# Patient Record
(demographics unavailable — no encounter records)

---

## 2024-10-14 NOTE — DISCUSSION/SUMMARY
[Patient] : the patient [With Me] : with me [___ Month(s)] : in [unfilled] month(s) [FreeTextEntry1] : 76-year-old male with past medical as above presenting for follow-up.  Stable symptoms - would benefit from both CVP referral for weight loss as well as Cardiac rehab.   1.  CAD - no change to anatomy.  Continue aspirin 81 mg p.o. daily 2.  Chronic systolic congestive heart failure-close to euvolemic on exam, CardioMEMS readings have been within range.  Continue Entresto to 24-26 mg p.o. twice daily, continue metoprolol succinate 50 mg p.o. twice daily, spironolactone 50 mg p.o. daily, torsemide 100 mg p.o. twice daily and Jardiance 10 mg p.o. daily. Extra 1/4 pill of torsemide per patient's request.  3.  Hyperlipidemia-continue aspirin 81 mg p.o. daily and atorvastatin 80 mg p.o. nightly.  RTC 3 months

## 2024-10-14 NOTE — PHYSICAL EXAM
[Well Developed] : well developed [Well Nourished] : well nourished [No Acute Distress] : no acute distress [Normal Conjunctiva] : normal conjunctiva [Normal Venous Pressure] : normal venous pressure [No Carotid Bruit] : no carotid bruit [Normal S1, S2] : normal S1, S2 [No Rub] : no rub [No Gallop] : no gallop [Clear Lung Fields] : clear lung fields [Good Air Entry] : good air entry [No Respiratory Distress] : no respiratory distress  [Soft] : abdomen soft [Non Tender] : non-tender [No Masses/organomegaly] : no masses/organomegaly [Normal Bowel Sounds] : normal bowel sounds [Abnormal Gait] : abnormal gait [No Edema] : no edema [No Cyanosis] : no cyanosis [No Clubbing] : no clubbing [No Varicosities] : no varicosities [No Rash] : no rash [No Skin Lesions] : no skin lesions [Moves all extremities] : moves all extremities [No Focal Deficits] : no focal deficits [Normal Speech] : normal speech [Alert and Oriented] : alert and oriented [Normal memory] : normal memory [de-identified] : AICD in situ.  [de-identified] : 1+ pitting bilaterally to mid shins

## 2024-10-14 NOTE — REASON FOR VISIT
[Cardiac Failure] : cardiac failure [Arrhythmia/ECG Abnorrmalities] : arrhythmia/ECG abnormalities [Hyperlipidemia] : hyperlipidemia [Hypertension] : hypertension [Coronary Artery Disease] : coronary artery disease [FreeTextEntry3] : Dr. Burroughs

## 2024-10-14 NOTE — HISTORY OF PRESENT ILLNESS
[FreeTextEntry1] : 76M past medical history of CAD status post CABG and PCI, chronic systolic congestive heart failure with an EF in the 20 to 25% range, AICD implantation, diabetes mellitus, hyperlipidemia, CKD and VT presenting for follow-up.  He was scheduled for nuclear stress testing but had chest pain on minimal exertion and was urged to go to the emergency room.  He ended up being added onto the schedule today to discuss cardiac catheterization.  10/14/2024: Feeling well from a cardiac perspective.  Denies significant chest pains and palpitations.

## 2024-10-14 NOTE — REVIEW OF SYSTEMS
[Dyspnea on exertion] : dyspnea during exertion [Lower Ext Edema] : lower extremity edema [Joint Pain] : joint pain [Negative] : Heme/Lymph [Chest Discomfort] : no chest discomfort

## 2024-10-18 NOTE — DISCUSSION/SUMMARY
[FreeTextEntry1] :  Continue daily readings. Track and trend Cardiomems numbers. Medications will be adjusted as per CMEMS and provider Follow up appointment scheduled.

## 2024-10-18 NOTE — HISTORY OF PRESENT ILLNESS
[de-identified] : CardioMEMs Summary 9/12/24-10/18/24 DOI 10/11/2022  AVG PAD:13-19 mmHg SUMMARY: During the period indicated above, the patient's pulmonary artery pressures were monitored at least weekly and based on the pressures, the pts medications were adjusted. Medication changes were communicated to the patient.   The daily data reports are in the Cardio MEMS flowsheet and are also archived in the [email or web address] PCN system. See [facility] flowsheet for Trends. I use this technology to monitor PA pressures on a weekly basis to ensure patients are within their optimal range to prevent decompensation. PMH

## 2024-11-11 NOTE — ASSESSMENT
[FreeTextEntry1] : 77 yo M with HFrEF 2/2 ICM, CAD s/p 3v CABG '08 + MVr ring annuloplasty, s/p cardioMEMS and CRT-D, VT ablation Feb 2020, inappropriate shock 11/20, FHx CAD, HTN, DLP, DM 2, CKD 3, COPD, obesity BMI 45, ex-smoker, pulmonary nodules and back pain who presents for heart failure follow-up.  He has ACC/AHA stage C cardiomyopathy with NYHA class II-III symptoms.

## 2024-11-11 NOTE — REASON FOR VISIT
[Cardiac Failure] : cardiac failure [FreeTextEntry3] : Juan Palmer MD [FreeTextEntry1] : Primary cardiologist: Dr. Luke EP: Dr. Curtis

## 2024-11-11 NOTE — CARDIOLOGY SUMMARY
[de-identified] : 11/11/2024: NSR, HR 83, RBBB question BiV pacing, diffuse T wave abnormality. 4/6/21: A-V Paced [de-identified] : 7/19/2024: LVEF 22%, LVEDD 7.4cm, IVSd 1.2cm, 1.3cm, mitral annuloplasty ring without significant regurgitation, mild TR. 6/29/2023: LVEF < 20%, LVEDD 7.2 cm, IVSd 1.1 cm, mitral annuloplasty ring with no paravalvular regurgitation, MG 3.5 mmHg, mild TR, PASP 15 mmHg. 11/3/20: LVEF 25-30% LVIDd 7.65cm LVOT VTI 11.1cm, ?mild MS, RV nl size/function,  mild TR, RVSP 36 mmHg (RAP ~10mmHg) [de-identified] : 7/17/23: Patent LIMA to LAD, SVG to RPDA and OM 1. LVEDP 14 10/11/22 RHC/MEMs implant RA 10, RV 36/10, PA 39/20(27), PCWP 17, [place] CO/CI 4.59/2.12. 11/2020 C LM tubular 30%, LAD ostial 100%, LCx mid 100%, RI luminal irreg, RCA prox 100%. LIMA patent. SVG-OM1 minor luminal irreg. SVG-RCA minor luminal irreg.  [de-identified] : 6MWT 9/24/24:  444 m, had to rest after 2 minutes and resumed after resting for 2 1/2 minutes. Complaints: Leg pain and [place], O2 sat 94-97%,HR 100s.

## 2024-11-11 NOTE — HISTORY OF PRESENT ILLNESS
[FreeTextEntry1] : 75 yo M with HFrEF 2/2 ICM, CAD s/p 3v CABG '08 + MVr ring annuloplasty, s/p cardioMEMS and CRT-D, VT ablation Feb 2020, inappropriate shock 11/20, FHx CAD, HTN, DLP, DM 2, CKD 3, COPD, obesity BMI 45, ex-smoker, pulmonary nodules and back pain presents today for heart failure follow-up.  Patient reports that his heart disease started at age 47 when he had his first MI. He had multiple MIs afterwards associated to ischemic cardiomyopathy. He underwent CABG in 2008 ([facility]) and has had 2 devices placed so far. Most recently he underwent CRT-D placement in 2016. He also had a VT ablation at [facility] in 2020. His last hospitalization was in 11/2020, when he presented to [facility] s/p [facility]. He was then transfer to [facility] for further work up and treatment. His ICD interrogation revealed that the device failed to discriminate SVT from VT and ATP eventually iatrogenically led to VT leading to shock. His device programming was changed to made VT-1 zone configuration (with detection criteria for 123 bpm) to monitor only without therapy. During this hospitalization, he underwent LHC which confirmed severe native CAD with patent grafts.  During the COVID pandemic, he had a severe respiratory infection (thought to be COVID but this was before routine testing) required intubation at [facility].  Since then he has had no recent HF related hospitalizations.   He was last seen by my colleague Dr. Hong on 2/17/2023.  I first met him in May 2024. GDMT up titration has been limited by hypotension.  We have got him on Verquvo 5 mg daily and his Entresto was uptitrated to 49-51mg BID.  Today, he notes some improvement with the tightness in his breathing since above medication changes.  He also found mold in his apartment which she thinks is contributing to some of his breathing issues.  He is seeing a lung doctor.  He notes he is having mucus that he coughs up about 12 times per day. He will have occasional orthopnea.  Intermittently he will experience nightly leg swelling and cramps.  During times of cramps he will take half a potassium pill which improves this.  The symptoms have been going on for the past year.   He has been referred to cardiac rehab many times however due to living out in [place] this has not been accessible to him.  He denies chest pain, palpitations, changes in appetite, changes in weight, bendopnea, lightheadedness, dizziness, and syncope/pre-syncope.  He is maintained on torsemide 100 mg twice daily.  He has a prescription for metolazone however he has not been using it as he has been struggling with the side effects of leg cramps.  He has to take extra potassium while using this.  Patient today notes he is very interested in Barrostim and would like to explore this further.  CardioMEMS interrogation: -PAD goal 16.  Patient has ranged from 16-21.

## 2024-11-11 NOTE — DISCUSSION/SUMMARY
[EKG obtained to assist in diagnosis and management of assessed problem(s)] : EKG obtained to assist in diagnosis and management of assessed problem(s) [FreeTextEntry1] : # HFrEF   - ETIOLOGY: Secondary to ischemic cardiomyopathy last [facility] in 2023 with patent grafts.   - GDMT: -Continue on metoprolol 50 mg XL twice daily, Entresto 49-51mg BID, spironolactone 50 mg daily, Jardiance 10 mg daily and Verquovo 5mg daily. -GDMT up titration has been limited by hypotension. - INCREASE Verquvo to 10 mg daily.   - DIURETICS: Euvolemic on exam. - S/p CardioMEMs: PAD GOAL 16.  Continue on torsemide 100mg BID. -Patient has an active prescription for metolazone however, he defers using this given severe cramping.  May need to consider Furoscix for augmented diuresis.   - LABS: -2024: K4.3, creatinine 1.48, EGFR 49, normal liver enzymes. -Lipid panel , , HDL 40, LDL 86.  Hemoglobin A1c 6.5. -2024: K4.5, creatinine 1.51, EGFR 48, normal liver enzymes. -Lipid panel: , HDL 42, LDL 75, .  Hemoglobin A1c 6.2. -2024: K4.3, creatinine 1.47, EGFR 49, Mg 2.5, TB 0.4, proBNP 667.5. -Repeat labs ordered, CMP, magnesium, NT proBNP.   - DEVICE: CRT-D in place.   -However, BiV pacing only at 55% with LV pacing at 94%.   -Would benefit from CRT-D optimization study with echo guidance - will schedule.    - ADVANCED THERAPIES: He has a number of signs of advanced HF with LV dilation of > 7cm, and decreasing GDMT.  -  6-minute walk test 444m on 24.   -At today's visit, we discussed a number of advanced therapy options includin. CRT-D optimization study - ordered today 2. Barrostim - will refer to Dr. Macias. 3. LVAD therapy.  DEBBIE prohibitive due to BMI > 45 and advanced age.  Patient feels that he would be a very high risk going through this procedure and would rather pursue possible option 1 or 2 prior to this. - Would favor RHC prior to Barrostim to ensure he is not in low output HF.    - REHAB: Due to mold in his house, patient defers in-home cardiac rehab at this time.   - SLEEP: Notes that he follows with a pulmonologist.  No prior diagnosis of SANTOSH.   - EDUCATION: HF education provided including lifestyle changes (low Na diet, fluid restriction, increase physical activity), current clinical condition, natural progression and prognosis.   #CAD s/p CABG -Managed by Dr. Luke.  Continue ASA/statin and risk factors modification  #VT s/p ablation (2020 [facility]) - H/o inappropriate shock in 2020.  Off antiarrhythmic therapy. - EP following  #CKD - Eventually may need nephrology referral  #Obesity BMI 45 -Unfortunately developed severe diarrhea with Mounjaro and had to stop this. Defers retrial of GLP-1 agonists.  F/U with HF team in 1 month with labs.

## 2024-12-06 NOTE — REASON FOR VISIT
[FreeTextEntry1] : Patient presents today for a CRT-D ECHO guided For details of the optimization please see the ECHO report.  Patient has a Medtronic Cobalt XT HF CRT-D Model # [id number] Serial # [id number] Longevity of Device: 6.4 years  Atrial  Impedance 418 Ohms Threshold 0.625 V @ 0.4 ms. P wave 5.6 mV.   RV Lead Impedance 266 Ohms Threshold 1.625 V @ 0.4 ms. R wave > 20.0 mV.  LV Lead Impedance 428 Ohms Threshold 1.0 V @ 0.4 ms.  Total  > 94.0 %.  Optivol below threshold

## 2024-12-31 NOTE — HISTORY OF PRESENT ILLNESS
[None] : The patient complains of no symptoms [de-identified] : CardioMEMs Summary 11/21/24- 12/24/24 DOI 10/11/2022  AVG PAD:19-22 mmHg Goal 16 mmHg SUMMARY: During the period indicated above, the patient's pulmonary artery pressures were monitored at least weekly and based on the pressures, the pts medications were adjusted. Medication changes were communicated to the patient.   The daily data reports are in the Cardio [employer] flowsheet and are also archived in the [email or web address] PCN system. See [facility] for Trends. I use this technology to monitor PA pressures on a weekly basis to ensure patients are within their optimal range to prevent decompensation. PMH

## 2024-12-31 NOTE — HISTORY OF PRESENT ILLNESS
[None] : The patient complains of no symptoms [de-identified] : CardioMEMs Summary 11/21/24- 12/24/24 DOI 10/11/2022  AVG PAD:19-22 mmHg Goal 16 mmHg SUMMARY: During the period indicated above, the patient's pulmonary artery pressures were monitored at least weekly and based on the pressures, the pts medications were adjusted. Medication changes were communicated to the patient.   The daily data reports are in the Cardio [employer] flowsheet and are also archived in the [email or web address] PCN system. [facility] for [facility]. I use this technology to monitor PA pressures on a weekly basis to ensure patients are within their optimal range to prevent decompensation. PMH

## 2025-01-03 NOTE — DISCUSSION/SUMMARY
[FreeTextEntry1] : # HFrEF   - ETIOLOGY: Secondary to ischemic cardiomyopathy last [facility] in July 2023 with patent grafts.   - GDMT: -Continue on metoprolol 50 mg XL twice daily, Entresto 49-51mg BID, spironolactone 50 mg daily, Jardiance 10 mg daily and Verquovo 10mg daily. -GDMT up titration has been limited by hypotension.   - DIURETICS: Euvolemic on exam. - S/p CardioMEMs: PAD GOAL 16.  Continue on torsemide 100mg BID. -Patient has an active prescription for metolazone however, he defers using this given severe cramping.  May need to consider Furoscix for augmented diuresis.   - LABS: -4/11/2024: K4.3, creatinine 1.48, EGFR 49, normal liver enzymes. -Lipid panel , , HDL 40, LDL 86.  Hemoglobin A1c 6.5. -7/18/2024: K4.5, creatinine 1.51, EGFR 48, normal liver enzymes. -Lipid panel: , HDL 42, LDL 75, .  Hemoglobin A1c 6.2. -8/30/2024: K4.3, creatinine 1.47, EGFR 49, Mg 2.5, TB 0.4, proBNP 667.5. -11/15/2024: K4.6, creatinine 1.49, EGFR 48, normal liver enzymes, proBNP 1036. -Lipid panel , , HDL 39, LDL 77.   - DEVICE: CRT-D in place.   -However, BiV pacing only at 55% with LV pacing at 94%. CRT-D optimization study - done on 12/4 with device optimization.  However, he has felt no different with this setting change.    - ADVANCED THERAPIES: He has a number of signs of advanced HF with LV dilation of > 7cm, and decreasing GDMT.  -  6-minute walk test 444m on 9/24/24.   - Barostim - planned for implant later this month.  Would do a RHC prior to Barostim. - We previously discussed LVAD therapy.  DEBBIE prohibitive due to BMI > 45 and advanced age.  Patient feels that he would be a very high risk going through this procedure and would rather pursue Barostim.   - REHAB: Due to mold in his house, patient defers in-home cardiac rehab at this time.   - SLEEP: Notes that he follows with a pulmonologist.  No prior diagnosis of SANTOSH.   - EDUCATION: HF education provided including lifestyle changes (low Na diet, fluid restriction, increase physical activity), current clinical condition, natural progression and prognosis.   #CAD s/p CABG -Managed by Dr. Luke.  Continue ASA/statin and risk factors modification  #VT s/p ablation (Feb 2020 [facility]) - H/o inappropriate shock in 11/2020.  Off antiarrhythmic therapy. - EP following  #CKD - following with a nephrologist - Dr. Peraza  #Obesity BMI 45 -Unfortunately developed severe diarrhea with Mounjaro and had to stop this. Defers retrial of GLP-1 agonists.  # Muscle cramps - Has normal liver enzymes.  Can continue on rosuvastatin 40 mg daily.  Will try a supplement of co-Q10.  F/U after Barostim (will discuss doing a RHC with patient prior to Barostim).

## 2025-01-03 NOTE — CARDIOLOGY SUMMARY
[de-identified] : 11/11/2024: NSR, HR 83, RBBB question BiV pacing, diffuse T wave abnormality. 4/6/21: A-V Paced [de-identified] : 7/19/2024: LVEF 22%, LVEDD 7.4cm, IVSd 1.2cm, 1.3cm, mitral annuloplasty ring without significant regurgitation, mild TR. 6/29/2023: LVEF < 20%, LVEDD 7.2 cm, IVSd 1.1 cm, mitral annuloplasty ring with no paravalvular regurgitation, MG 3.5 mmHg, mild TR, PASP 15 mmHg. 11/3/20: LVEF 25-30% LVIDd 7.65cm LVOT VTI 11.1cm, ?mild MS, RV nl size/function,  mild TR, RVSP 36 mmHg (RAP ~10mmHg) [de-identified] : 7/17/23: Patent LIMA to LAD, SVG to RPDA and OM 1. LVEDP 14 10/11/22 RHC/MEMs implant RA 10, RV 36/10, PA 39/20(27), PCWP 17, [place] CO/CI 4.59/2.12. 11/2020 C LM tubular 30%, LAD ostial 100%, LCx mid 100%, RI luminal irreg, RCA prox 100%. LIMA patent. SVG-OM1 minor luminal irreg. SVG-RCA minor luminal irreg.  [de-identified] : 6MWT 9/24/24:  444 m, had to rest after 2 minutes and resumed after resting for 2 1/2 minutes. Complaints: Leg pain and [place], O2 sat 94-97%,HR 100s.

## 2025-01-03 NOTE — PHYSICAL EXAM
[Normal] : soft, non-tender, no masses/organomegaly, normal bowel sounds [No Rash] : no rash [Alert and Oriented] : alert and oriented [Well Developed] : well developed [No Acute Distress] : no acute distress [Obese] : obese [Normal Venous Pressure] : normal venous pressure [de-identified] : trace edema B/L lower extremities, warm

## 2025-01-03 NOTE — HISTORY OF PRESENT ILLNESS
[FreeTextEntry1] : 75 yo M with HFrEF 2/2 ICM, CAD s/p 3v CABG '08 + MVr ring annuloplasty, s/p cardioMEMS and CRT-D, VT ablation Feb 2020, inappropriate shock 11/20, FHx CAD, HTN, DLP, DM 2, CKD 3, COPD, obesity BMI 45, ex-smoker, pulmonary nodules and back pain presents today for heart failure follow-up.  Patient reports that his heart disease started at age 47 when he had his first MI. He had multiple MIs afterwards associated to ischemic cardiomyopathy. He underwent CABG in 2008 ([facility] [place]) and has had 2 devices placed so far. Most recently he underwent CRT-D placement in 2016. He also had a VT ablation at [facility] in 2020. His last hospitalization was in 11/2020, when he presented to [facility] [facility] He was then transfer to [facility] for further work up and treatment. His ICD interrogation revealed that the device failed to discriminate SVT from VT and ATP eventually iatrogenically led to VT leading to shock. His device programming was changed to made VT-1 zone configuration (with detection criteria for 123 bpm) to monitor only without therapy. During this hospitalization, he underwent LHC which confirmed severe native CAD with patent grafts.  During the COVID pandemic, he had a severe respiratory infection (thought to be COVID but this was before routine testing) required intubation at [facility].  Since then he has had no recent HF related hospitalizations.   He was last seen by my colleague Dr. Hong on 2/17/2023.  I first met him in May 2024. GDMT up titration has been limited by hypotension.  We have got him on Verquvo 5 mg daily and his Entresto was uptitrated to 49-51mg BID.  I last saw him on 11/11/2024.  Verquovo was increased to 10 mg daily.  CRT-D optimization study was ordered and done on 12/4/2024 and is planned for Barostim this month.  Today, he reports having a different salt balance in his body.  He is very sensitive.  He is cut down his sodium to about 1500 mg/day.  He has had multiple CardioMEMS fluctuations over the holidays.  He he is looking forward to his Barostim implant.  He continues to feel short of breath on short distances.  He had a very bad response to Mounjaro and does not want to try Ozempic.  He has ongoing muscle cramps.  He denies chest pain, palpitations, changes in appetite, changes in weight, bendopnea, lightheadedness, dizziness, and syncope/pre-syncope.  He is maintained on torsemide 100 mg twice daily.  He has a prescription for metolazone however he has not been using it as he has been struggling with the side effects of leg cramps.  He has to take extra potassium while using this.    CardioMEMS interrogation: Compliance 96%. -PAD goal 16.  Patient has ranged from 16-21.

## 2025-01-22 NOTE — REASON FOR VISIT
[FreeTextEntry1] : Patient presents today for a CRT-D ECHO guided For details of the optimization please see the ECHO report.  Patient has a Medtronic Cobalt XT HF CRT-D Model # [id number] Serial # [id number] Longevity of Device: 6.4 years  Atrial  Impedance 418 Ohms Threshold 0.625 V @ 0.4 ms. P wave 5.6 mV.   RV Lead Impedance 266 Ohms Threshold 1.625 V @ 0.4 ms. R wave > 20.0 mV.  LV Lead Impedance 428 Ohms Threshold 1.0 V @ 0.4 ms.  Total  > 94.0 %.  Optivol below threshold

## 2025-01-22 NOTE — DISCUSSION/SUMMARY
[FreeTextEntry1] : ECHO guided optimization was performed no significant response  Normal functioning CRT-D device  Patient scheduled for a Barostim next week.   I spent 30 minutes with the patient: 5 minutes preparing for the visit. 15 minutes face to face and  10 minutes on documentation, counselling and coordination of care.

## 2025-01-29 NOTE — HISTORY OF PRESENT ILLNESS
[None] : The patient complains of no symptoms [de-identified] : CardioMEMs Summary Reviewed 12/24/24-1/29/25 DOI 10/11/2022  AVG PAD:19-23 mmHg Goal 16 mmHg SUMMARY: During the period indicated above, the patient's pulmonary artery pressures were monitored at least weekly and based on the pressures, the pts medications were adjusted. Medication changes were communicated to the patient.   The daily data reports are in the Cardio MEMS flowsheet and are also archived in the [email or web address] PCN system. See [facility] flowsheet for Trends. I use this technology to monitor PA pressures on a weekly basis to ensure patients are within their optimal range to prevent decompensation. PMH

## 2025-02-04 NOTE — DISCUSSION/SUMMARY
[FreeTextEntry1] : ECHO guided optimization was performed no significant response  Normal functioning CRT-D device  Patient s/p Barostim insertion.   The patient was tested as follows: Time 0 min - PAD 17 mm Hg /56   0 ma          2 min - PAD 19 mm Hg /62.  6 ma          4 miin - PAD 16 mm Hg /62  6 ma          6 min - PAD. 18 mm Hg /63. 4 ma          8 min - PAD  18 mm Hg /62. 2 ma         12 min - PAD  17 mm Hg BP 98/62.  2 ma         14 min - PAD  16 mm Hg BP 98/62.  0 ma  F/U in one week for wound check and possible uptitration I spent 40 minutes with the patient includin minutes preparing for the visit 25 minutes face to face. 10 minutes on documentation and coordination of care.    I spent 30 minutes with the patient: 5 minutes preparing for the visit. 15 minutes face to face and  10 minutes on documentation, counselling and coordination of care.

## 2025-02-04 NOTE — REASON FOR VISIT
[FreeTextEntry1] : 76-year-old male s/p insertion of a Barostim Device.  Patient complaining of fatigue. Baseline exercise tolerance. Patient has a CardioMems in place. Mild hematoma and ecchymosis at site of IPG Mild to moderate hematoma at carotid incision site  No difficulty with swallowing no evidence of stridor.  The patient is here for evaluation and possible optimazation. It was decided to start optimization once the patients neck incidion heals.

## 2025-02-04 NOTE — PHYSICAL EXAM

## 2025-02-16 NOTE — REASON FOR VISIT
[FreeTextEntry1] : 76-year-old male s/p insertion of a Barostim Device.  Patient complaining of fatigue. Baseline exercise tolerance. Patient has a CardioMems in place. Mild hematoma and ecchymosis at site of IPG Mild to moderate hematoma at carotid incision site  No difficulty with swallowing no evidence of stridor.  The patient is here for evaluation and possible optimazation. It was decided to start optimization once the patients neck incidion heals.   Interval History (02/12/2025) - Patient's neck reduced swelling, healing well. Here for initiation of titration. His CardioMems at target. Complaining of facial numbness most dramatic below chin. No stridor. No facial paralysis.

## 2025-02-16 NOTE — DISCUSSION/SUMMARY
[FreeTextEntry1] : ECHO guided optimization was performed no significant response  Normal functioning CRT-D device  Patient s/p Barostim insertion.   The patient was tested last visit as follows: Time 0 min - PAD 17 mm Hg /56   0 ma          2 min - PAD 19 mm Hg /62.  6 ma          4 miin - PAD 16 mm Hg /62  6 ma          6 min - PAD. 18 mm Hg /63. 4 ma          8 min - PAD  18 mm Hg /62. 2 ma         12 min - PAD  17 mm Hg BP 98/62.  2 ma         14 min - PAD  16 mm Hg BP 98/62.  0 ma  Today PAD 16, titrated from 0 ma to 2.0 ma Walked and hemodynamically stable.   F/U in one week for wound check and possible uptitration F/U with Dr. Pacheco to assess neck complaints.  I spent 40 minutes with the patient includin minutes preparing for the visit 25 minutes face to face. 10 minutes on documentation and coordination of care.    I spent 30 minutes with the patient: 5 minutes preparing for the visit. 15 minutes face to face and  10 minutes on documentation, counselling and coordination of care.

## 2025-02-16 NOTE — PHYSICAL EXAM

## 2025-02-20 NOTE — REASON FOR VISIT
[FreeTextEntry1] : 76-year-old male s/p insertion of a Barostim Device.  Patient complaining of fatigue. Baseline exercise tolerance. Patient has a CardioMems in place. Mild hematoma and ecchymosis at site of IPG Mild to moderate hematoma at carotid incision site  No difficulty with swallowing no evidence of stridor.  The patient is here for evaluation and possible optimazation. It was decided to start optimization once the patients neck incidion heals.   Interval History (02/12/2025) - Patient's neck reduced swelling, healing well. Here for initiation of titration. His CardioMems at target. Complaining of facial numbness most dramatic below chin. No stridor. No facial paralysis.  Interval History (02/19/2025) - Reduced swelling in his neck, still with numbness, pocket wound healing well, no interval change. Here for a Barostim titration. Patient's device programmed at 2 ma. PAD today is 17, target is 16.

## 2025-02-20 NOTE — DISCUSSION/SUMMARY
[FreeTextEntry1] : ECHO guided optimization was performed no significant response  Normal functioning CRT-D device  Patient s/p Barostim insertion.   The patient was tested last visit as follows: Time 0 min - PAD 17 mm Hg /56   0 ma          2 min - PAD 19 mm Hg /62.  6 ma          4 miin - PAD 16 mm Hg /62  6 ma          6 min - PAD. 18 mm Hg /63. 4 ma          8 min - PAD  18 mm Hg /62. 2 ma         12 min - PAD  17 mm Hg BP 98/62.  2 ma         14 min - PAD  16 mm Hg BP 98/62.  0 ma  Today PAD 17, target is 16, titrated from 2 ma to 4.0 ma Walked and hemodynamically stable.   F/U in two weeks for wound check and further uptitration F/U with Dr. Pacheco to assess neck complaints.  I spent 40 minutes with the patient includin minutes preparing for the visit 25 minutes face to face. 10 minutes on documentation and coordination of care.    I spent 30 minutes with the patient: 5 minutes preparing for the visit. 15 minutes face to face and  10 minutes on documentation, counselling and coordination of care.

## 2025-02-28 NOTE — ASSESSMENT
[FreeTextEntry1] : 75 yo male with cramping in neck at skull base. Not related to barostim procedure  Pt counseled on above diagnosis. Pt to FU with PCP about neck cramping

## 2025-02-28 NOTE — HISTORY OF PRESENT ILLNESS
[FreeTextEntry1] : 77 yo male with neck cramping after barostim. Pt had barostim in January 2025. Prior to procedure pt was having cramping at skull base which has now increased. Incision has closed and healed well.

## 2025-03-04 NOTE — HISTORY OF PRESENT ILLNESS
[None] : The patient complains of no symptoms [de-identified] : CardioMEMs Summary  Reviewed 1/29/25-3/4/25 DOI 10/11/2022  AVG PAD:15-19 mmHg Goal 16 mmHg SUMMARY: During the period indicated above, the patient's pulmonary artery pressures were monitored at least weekly and based on the pressures, the pts medications were adjusted. Medication changes were communicated to the patient.   The daily data reports are in the Cardio MEMS flowsheet and are also archived in the [email or web address] [facility] system. See [facility] flowsheet for Trends. I use this technology to monitor PA pressures on a weekly basis to ensure patients are within their optimal range to prevent decompensation. PMH

## 2025-03-04 NOTE — HISTORY OF PRESENT ILLNESS
[None] : The patient complains of no symptoms [de-identified] : CardioMEMs Summary  Reviewed 1/29/25-3/4/25 DOI 10/11/2022  AVG PAD:15-19 mmHg Goal 16 mmHg SUMMARY: During the period indicated above, the patient's pulmonary artery pressures were monitored at least weekly and based on the pressures, the pts medications were adjusted. Medication changes were communicated to the patient.   The daily data reports are in the Cardio MEMS flowsheet and are also archived in the [email or web address] [facility] system. See [facility] flowsheet for Trends. I use this technology to monitor PA pressures on a weekly basis to ensure patients are within their optimal range to prevent decompensation. PMH

## 2025-03-05 NOTE — DISCUSSION/SUMMARY
[FreeTextEntry1] : ECHO guided optimization was performed no significant response  Normal functioning CRT-D device  Patient s/p Barostim insertion.   The patient was tested last visit as follows: Time 0 min - PAD 17 mm Hg /56   0 ma          2 min - PAD 19 mm Hg /62.  6 ma          4 miin - PAD 16 mm Hg /62  6 ma          6 min - PAD. 18 mm Hg /63. 4 ma          8 min - PAD  18 mm Hg /62. 2 ma         12 min - PAD  17 mm Hg BP 98/62.  2 ma         14 min - PAD  16 mm Hg BP 98/62.  0 ma  Today PAD 19, target is 16, titrated from 4 ma to 6.0 ma Walked and hemodynamically stable.   F/U in two weeks for wound check and further uptitration F/U with Dr. Pacheco to assess neck complaints.  I spent 40 minutes with the patient includin minutes preparing for the visit 25 minutes face to face. 10 minutes on documentation and coordination of care.    I spent 30 minutes with the patient: 5 minutes preparing for the visit. 15 minutes face to face and  10 minutes on documentation, counselling and coordination of care.

## 2025-03-05 NOTE — REASON FOR VISIT
[FreeTextEntry1] : 76-year-old male s/p insertion of a Barostim Device.  Patient complaining of fatigue. Baseline exercise tolerance. Patient has a Cardio Mems in place. Mild hematoma and ecchymosis at site of IPG Mild to moderate hematoma at carotid incision site  No difficulty with swallowing no evidence of stridor.  The patient is here for evaluation and possible optimization. It was decided to start optimization once the patient's neck incision heals.   Interval History (02/12/2025) - Patient's neck reduced swelling, healing well. Here for initiation of titration. His Cardio Mems at target. Complaining of facial numbness most dramatic below chin. No stridor. No facial paralysis.  Interval History (02/19/2025) - Reduced swelling in his neck, still with numbness, pocket wound healing well, no interval change. Here for a Barostim titration. Patient's device programmed at 2 ma. PAD today is 17, target is 16.  Interval History (03/05/2025) - Healing well, patient with fluctuation in his PAD 18 -16-19, Has taken Furosex. Feels 5 % improvement. Admits to drinking upwards of 6 -8 liters of free water daily. Was titrated to 4 during his last visit. Presents for further titration.

## 2025-03-05 NOTE — PHYSICAL EXAM

## 2025-04-29 NOTE — DISCUSSION/SUMMARY
[FreeTextEntry1] : ECHO guided optimization was performed no significant response  Normal functioning CRT-D device  Patient s/p Barostim insertion.   The patient was tested last visit as follows: Time 0 min - PAD 17 mm Hg /56   0 ma          2 min - PAD 19 mm Hg /62.  6 ma          4 miin - PAD 16 mm Hg /62  6 ma          6 min - PAD. 18 mm Hg /63. 4 ma          8 min - PAD  18 mm Hg /62. 2 ma         12 min - PAD  17 mm Hg BP 98/62.  2 ma         14 min - PAD  16 mm Hg BP 98/62.  0 ma  Today PAD 15, target is 16, at 8.0 ma Walked and hemodynamically stable.   F/U in 2-3 months or as needed.   I spent 40 minutes with the patient includin minutes preparing for the visit 25 minutes face to face. 10 minutes on documentation and coordination of care.    I spent 30 minutes with the patient: 5 minutes preparing for the visit. 15 minutes face to face and  10 minutes on documentation, counselling and coordination of care.

## 2025-04-29 NOTE — PHYSICAL EXAM

## 2025-04-29 NOTE — REASON FOR VISIT
[FreeTextEntry1] : 76-year-old male s/p insertion of a Barostim Device.  Patient complaining of fatigue. Baseline exercise tolerance. Patient has a Cardio Mems in place. Mild hematoma and ecchymosis at site of IPG Mild to moderate hematoma at carotid incision site  No difficulty with swallowing no evidence of stridor.  The patient is here for evaluation and possible optimization. It was decided to start optimization once the patient's neck incision heals.   Interval History (02/12/2025) - Patient's neck reduced swelling, healing well. Here for initiation of titration. His Cardio Mems at target. Complaining of facial numbness most dramatic below chin. No stridor. No facial paralysis.  Interval History (02/19/2025) - Reduced swelling in his neck, still with numbness, pocket wound healing well, no interval change. Here for a Barostim titration. Patient's device programmed at 2 ma. PAD today is 17, target is 16.  Interval History (03/05/2025) - Healing well, patient with fluctuation in his PAD 18 -16-19, Has taken Furosex. Feels 5 % improvement. Admits to drinking upwards of 6 -8 liters of free water daily. Was titrated to 4 during his last visit. Presents for further titration.  Interval History (03/19/2025) - Healing well, PAD at 16 (target 16), here for further titration.  Interval History (04/29/2025) - PAD 15 (goal 16). Patient doing well, no interval changes here for F/U s/p Barostim insertion.

## 2025-05-12 NOTE — HISTORY OF PRESENT ILLNESS
[de-identified] : CardioMEMs Summary  Reviewed 4/8/25-5/9/25 DOI 10/11/2022  AVG PAD:16-22 mmHg Goal 16 mmHg SUMMARY: During the period indicated above, the patient's pulmonary artery pressures were monitored at least weekly and based on the pressures, the pts medications were adjusted. Medication changes were communicated to the patient.   The daily data reports are in the Cardio MEMS flowsheet and are also archived in the [email or web address] [facility] system. See [facility] flowsheet for Trends. I use this technology to monitor PA pressures on a weekly basis to ensure patients are within their optimal range to prevent decompensation. PMH

## 2025-05-12 NOTE — HISTORY OF PRESENT ILLNESS
[de-identified] : CardioMEMs Summary  Reviewed 4/8/25-5/9/25 DOI 10/11/2022  AVG PAD:16-22 mmHg Goal 16 mmHg SUMMARY: During the period indicated above, the patient's pulmonary artery pressures were monitored at least weekly and based on the pressures, the pts medications were adjusted. Medication changes were communicated to the patient.   The daily data reports are in the Cardio MEMS flowsheet and are also archived in the [email or web address] [facility] system. See [facility] flowsheet for Trends. I use this technology to monitor PA pressures on a weekly basis to ensure patients are within their optimal range to prevent decompensation. PMH

## 2025-05-21 NOTE — HISTORY OF PRESENT ILLNESS
[FreeTextEntry1] : Omar Alaniz is a 76 year old male with medical history significant for CAD s/p CABG, HTN, HLD, CHF, hypothyroidism, GERD, COPD, former smoker presenting today for initial neurological evaluation.   He endorses numbness of both feet, affecting him over the past year.  Sometimes there is tingling, and there is also pain when he walks on the or wears textured shoes like crocs which have bumps on the bottom of the foot.  He also has a history of CHF, and intermittently he will get swollen feet to a point where they fill up the whole shoe and also cause him pain.  He does not get any paresthesia above the ankle.  He is unsure if he was diagnosed with diabetes.  He does feel unsteady when he walks like he is off balance, but does not have any falls.  He does not use any ambulatory devices.  He denies any paresthesia in his hands or fingertips.  He denies any focal weakness, saddle anesthesia, incontinence. Some chronic back pain.  He also endorses intermittent visual disturbances, which started 2 years ago.  It will occur once every 2 to 3 weeks, typically happens when he is working at the computer and looking at the screen for a long time.  He describes a disturbance as sparkly white static covering the left half of his vision, episodes will last for 20 minutes before resolving.  He just has to wait the episodes out and they always go away.  He denies any history of headaches in the past, any associated headaches, nausea, sound sensitivity, strokelike symptoms like drooping face, focal weakness, slurred speech during this.  He has appointment scheduled for June 2025 with ophthalmologist, he got referral from his PCP.  He says his BP readings are typically on the lower side, and today he was in a rush to get to erwin [place] from [place], so he did not take his cardiac medications yet.  He follows up with cardiology regularly.  He has cardiac device implants, including AICD, pacemaker, Barostim, CardioMEMS, he is unsure if these are MRI compatible but he will find out.  He feels his baseline well today.

## 2025-05-21 NOTE — ASSESSMENT
[FreeTextEntry1] : 76 year old male with medical history significant for CAD s/p CABG, HTN, HLD, CHF, hypothyroidism, GERD, COPD, former smoker presenting today for initial neurological evaluation. Endorsing b/l feet paresthesia over the past year, and intermittent visual disturbance over past 2 years after working on computer. Physical exam with b/l feet decreased sensation to LT, vibration. Dependent edema. BP 80s/50s on manual recheck, per pt did not take his cardiac meds today. Feeling asymptomatic at time of visit.  Recommendations: # Neuropathy - per chart review, there is DM in active problems, but pt denies this dx. He has h/o CHF with edema which can also cause paresthesia. We will get work up to eval for neuropathy/radiculopathy. - EMG/NCS of BLE to evaluate for conduction abnormalities - laboratory work up for neuropathy - healthy lifestyle, diet, physical activity  # Transient visual disturbance - ddx includes TIA vs ocular migraine - MRI head evaluate for stroke - consider CT if devices not MRI compatible - consider vessel imaging  - ophthalmology referral - pt has appt 6/09 with Dr. Walker - avoid excessive screen time or eye strain  Patient to return to office in 6 weeks, or sooner if needed. Patient understands to seek urgent medical evaluation for any new or worsening symptoms.

## 2025-05-21 NOTE — PHYSICAL EXAM
[FreeTextEntry1] : NEURO: Mental Status Oriented to person, place, time, and situation Speech is clear, fluent, and spontaneous. Comprehension and memory intact   Cranial Nerves Visual fields full to confrontation Pupils equal, round, reactive to light. Extraocular movements intact. No nystagmus or ptosis. Facial sensation intact and symmetric in V1, V2, V3 Facial movement intact and symmetric Uvula midline, soft palate elevates normally Bilateral shoulder shrug intact Tongue midline, no tongue bite sign or deviation on protrusion   Motor Tone and bulk intact Shoulder abduction: 5/5 b/l Elbow flexion/extension: 5/5 bl Hand : 5/5 b/l Hip flexion/extension: 5/5 b/l Knee flexion/extension: 5/5 b/l No pronator drift   Sensation Light touch diminished b/l dorsum of feet Vibration impaired b/l toes, ankles. Intact b/l knee.  Temperature intact.   Coordination Normal finger to nose bilaterally No past pointing   Wide based gait.    GEN: awake, alert, interactive, no acute distress EYES: sclera white, conjunctiva clear, no redness or discharge ENT: normal appearing outer ears EXT: moving all extremities spontaneously, dependent edema BLE SKIN: warm, dry

## 2025-05-28 NOTE — PHYSICAL EXAM
Chronic, controlled on metformin  Most recent A1C 5.8 three months ago  Will recheck today  Continue metformin, diet, and exercise   [Well Developed] : well developed [Well Nourished] : well nourished [No Acute Distress] : no acute distress [Normal Conjunctiva] : normal conjunctiva [Normal Venous Pressure] : normal venous pressure [No Carotid Bruit] : no carotid bruit [Normal S1, S2] : normal S1, S2 [No Murmur] : no murmur [No Rub] : no rub [No Gallop] : no gallop [Clear Lung Fields] : clear lung fields [Good Air Entry] : good air entry [No Respiratory Distress] : no respiratory distress  [Soft] : abdomen soft [Non Tender] : non-tender [No Masses/organomegaly] : no masses/organomegaly [Normal Bowel Sounds] : normal bowel sounds [Normal Gait] : normal gait [No Edema] : no edema [No Cyanosis] : no cyanosis [No Clubbing] : no clubbing [No Varicosities] : no varicosities [No Rash] : no rash [No Skin Lesions] : no skin lesions [Moves all extremities] : moves all extremities [No Focal Deficits] : no focal deficits [Normal Speech] : normal speech [Alert and Oriented] : alert and oriented [Normal memory] : normal memory [de-identified] : see H & P

## 2025-06-17 NOTE — HISTORY OF PRESENT ILLNESS
[None] : The patient complains of no symptoms [de-identified] : CardioMEMs Summary See scanned in documents for updated data Reviewed between 5/8/25-6/17/25 DOI 10/11/2022  AVG PAD:16-22 mmHg Goal 16 mmHg SUMMARY: During the period indicated above, the patient's pulmonary artery pressures were monitored at least weekly and based on the pressures, the pts medications were adjusted. Medication changes were communicated to the patient.   The daily data reports are in the Cardio MEMS flowsheet and are also archived in the [email or web address] [facility] system. [facility] flow[facility] for Trends. I use this technology to monitor PA pressures on a weekly basis to ensure patients are within their optimal range to prevent decompensation. PMH

## 2025-06-17 NOTE — HISTORY OF PRESENT ILLNESS
[None] : The patient complains of no symptoms [de-identified] : CardioMEMs Summary See scanned in documents for updated data Reviewed between 5/8/25-6/17/25 DOI 10/11/2022  AVG PAD:16-22 mmHg Goal 16 mmHg SUMMARY: During the period indicated above, the patient's pulmonary artery pressures were monitored at least weekly and based on the pressures, the pts medications were adjusted. Medication changes were communicated to the patient.   The daily data reports are in the Cardio MEMS flowsheet and are also archived in the [email or web address] [facility] system. [facility] for T[facility]. I use this technology to monitor PA pressures on a weekly basis to ensure patients are within their optimal range to prevent decompensation. PMH

## 2025-06-24 NOTE — DISCUSSION/SUMMARY
[Patient] : the patient [FreeTextEntry1] : STEPHANIE COKER is a 77 year old M who presents today with the above history and the following active issues:. SOB Fluid overload  STAT labs Echo within next 2 months Dose diuretics according to PAD.  Educated pt on low sodium diet and adherence to medication plan Points discussed  Reduce the  prepared foods, fast foods, frozen dinners, and canned foods that are higher in sodium Use spices, herbs, and sodium-free seasonings in place of salt. Look for food labels with words like sodium free or salt free; or low, reduced, or no salt or sodium; or unsalted or lightly salted.  Discussed red flag symptoms, which would warrant sooner or emergent medical evaluation. Any questions and concerns were addressed and resolved.  I spent 45 minutes with the patient: 5 minutes in preparation of the visit. 15 minutes face to face and 20 minutes on documentation and coordination of care.  5 mins spent reviewing patients' history, testing, medications and plan with supervising MD:   [EKG obtained to assist in diagnosis and management of assessed problem(s)] : EKG obtained to assist in diagnosis and management of assessed problem(s)

## 2025-06-24 NOTE — HISTORY OF PRESENT ILLNESS
[FreeTextEntry1] : 77-year-old male s/p insertion of a Barostim Device. He has been c/o SOB, myalgias, Swelling of hands and feet, fatigue and severe muscle cramps He takes his medications although takes doses he considers best. Patient complaining of fatigue. Baseline exercise intolerance. Patient has a Cardio Mems in place. PAD has been elevated 20-23 Goal 15. Pt was instructed to 1/2 his Entresto and diuretics. He did that for 2 days but resumed full dosage. It was explained to the pt his creat was elevated that is why his meds were changed. Pt verbalizes understanding however he still will take his medications as he believes is best He is drinking 4-5 liters of water and does eat salty foods He also c/o severe cramping in legs and hands He randomly takes Potassium and MG 9/16 K 4.5 creat 2.0 BNP 1043 EF< 15%

## 2025-06-24 NOTE — REVIEW OF SYSTEMS
[Negative] : Heme/Lymph [Weight Gain (___ Lbs)] : [unfilled] ~Ulb weight gain [Feeling Fatigued] : feeling fatigued [SOB] : shortness of breath [Dyspnea on exertion] : dyspnea during exertion [Lower Ext Edema] : no extremity edema [Palpitations] : palpitations [Orthopnea] : orthopnea [Limb Weakness (Paresis)] : limb weakness (Paresis)

## 2025-07-22 NOTE — HISTORY OF PRESENT ILLNESS
[None] : The patient complains of no symptoms [de-identified] : CardioMEMs Summary  Reviewed 6/17/2025-7/21/2025 Documentation of readings are scanned into EMR, they are reviewed daily DOI 10/11/2022 Goal 16 mmHg SUMMARY: During the period indicated above, the patient's pulmonary artery pressures were monitored at least weekly and based on the pressures, the pts medications were adjusted. Medication changes were communicated to the patient.   The daily data reports are in the Cardio MEMS flowsheet and are also archived in the [email or web address] system. See [facility] flowsheet for Trends. I use this technology to monitor PA pressures on a weekly basis to ensure patients are within their optimal range to prevent decompensation. [facility]